# Patient Record
Sex: FEMALE | Race: WHITE | Employment: UNEMPLOYED | URBAN - METROPOLITAN AREA
[De-identification: names, ages, dates, MRNs, and addresses within clinical notes are randomized per-mention and may not be internally consistent; named-entity substitution may affect disease eponyms.]

---

## 2021-01-01 ENCOUNTER — OFFICE VISIT (OUTPATIENT)
Dept: NEUROLOGY | Age: 41
End: 2021-01-01
Payer: COMMERCIAL

## 2021-01-01 VITALS
TEMPERATURE: 97.7 F | RESPIRATION RATE: 18 BRPM | WEIGHT: 149 LBS | HEART RATE: 108 BPM | SYSTOLIC BLOOD PRESSURE: 141 MMHG | BODY MASS INDEX: 23.39 KG/M2 | DIASTOLIC BLOOD PRESSURE: 81 MMHG | HEIGHT: 67 IN

## 2021-01-01 DIAGNOSIS — G40.909 SEIZURE DISORDER (HCC): Primary | ICD-10-CM

## 2021-01-01 PROCEDURE — 99203 OFFICE O/P NEW LOW 30 MIN: CPT | Performed by: NEUROMUSCULOSKELETAL MEDICINE, SPORTS MEDICINE

## 2021-01-01 RX ORDER — AMLODIPINE BESYLATE 10 MG/1
10 TABLET ORAL DAILY
COMMUNITY

## 2021-01-01 RX ORDER — LEVETIRACETAM 500 MG/1
1000 TABLET ORAL 2 TIMES DAILY
COMMUNITY
End: 2021-01-01 | Stop reason: SDUPTHER

## 2021-01-01 RX ORDER — ATORVASTATIN CALCIUM 40 MG/1
40 TABLET, FILM COATED ORAL DAILY
COMMUNITY

## 2021-01-01 RX ORDER — FENOFIBRATE 160 MG/1
160 TABLET ORAL DAILY
COMMUNITY

## 2021-01-01 RX ORDER — LEVETIRACETAM 500 MG/1
1000 TABLET ORAL 2 TIMES DAILY
Qty: 120 TABLET | Refills: 5 | Status: SHIPPED | OUTPATIENT
Start: 2021-01-01 | End: 2022-01-01 | Stop reason: SDUPTHER

## 2021-01-01 RX ORDER — FERROUS SULFATE 325(65) MG
325 TABLET ORAL
COMMUNITY

## 2021-01-01 RX ORDER — ASPIRIN 81 MG/1
81 TABLET ORAL DAILY
COMMUNITY

## 2021-10-18 NOTE — PROGRESS NOTES
NEUROLOGY CONSULT    Patient Name:  Ashley Arriaga  :   1980  Clinic Visit Date: 10/18/2021    I saw Ms. Ashley Arriaga  in the neurology clinic today for continuity of care regarding a seizure disorder. Patient is a 66-year-old right-handed lady with a history of multiple problems including hypertension hyperlipidemia peripheral vascular disease, diabetes with diagnosis of \"peripheral neuropathy \", seen recently by another neurologist, seen in the office today for follow-up evaluation of her seizure disorder as her insurance has changed. She says that she started having seizures about a year ago. She describes seizures as \"passing out \"without any warning symptoms. She has been found in the floor at home by her  on many occasions. Denies having aura prior to passing out. Does not recollect having incontinence or tongue bite. No witnessed abnormal extremity movements. She says that she used to have these episodes fairly frequently until she was started on Keppra by her neurologist in 2020. She is currently on 1000 mg twice daily and since then she has done well without any seizures. Work-up according to her history includes an MRI and EEG both of which have been unremarkable. Scans or reports not available for review at this time. She denies headache, abnormal visual symptoms, facial numbness or weakness, slurred speech, neck pain lower back pain or weakness or numbness in the extremities. No family history of seizures. No history of head trauma in the past    REVIEW OF SYSTEMS    Constitutional Weight changes: absent, change in appetite: absent Fatigue: absent; Fevers : absent, Any recent hospitalizations:  absent   HEENT Ears: normal,  Visual disturbance: absent   Respiratory Shortness of breath: absent, choking:  absent, Cough: absent, Snoring : absent   Cardiovascular Chest pain: absent, Leg swelling :absent, palpitations : absent, fainting : absent   GI Constipation: absent, Diarrhea: absent, Swallowing change: absent    Urinary frequency: absent, Urinary urgency: absent, Urinary incontinence: absent   Musculoskeletal Neck pain: absent, Back pain: absent, Stiffness: present, Muscle pain: absent, Joint pain: absent, restless leg : present   Dermatological Hair loss: absent, Skin changes: absent   Neurological Confusion: absent, Trouble concentrating: present, Seizures: present;  Memory loss: absent, balance problem: present, Dizziness: present, vertigo: absent, Weakness: absent, Numbness absent, Tremor: absent, Spasm: absent, involuntary movement: absent, Speech difficulty: absent, Headache: absent, Light sensitivity: absent   Psychiatric Anxiety: present, Depression  present, drug abuse: absent, Hallucination: absent, mood disorder: absent, Suicidal ideations absent   Hematologic Abnormal bleeding: absent, Anemia: absent, Lymph gland changes: absent Clotting disorder: absent     Past Medical History:   Diagnosis Date    Diabetes mellitus ([facility] [place] [facility]     Hypertension     Seizures ([facility] [place] [facility]        Past Surgical History:   Procedure Laterality Date    CATARACT REMOVAL Bilateral     CHOLECYSTECTOMY      TONSILLECTOMY         Social History     Socioeconomic History    Marital status:      Spouse name: Not on file    Number of children: Not on file    Years of education: Not on file    Highest education level: Not on file   Occupational History    Not on file   Tobacco Use    Smoking status: Never Smoker    Smokeless tobacco: Never Used   Vaping Use    Vaping Use: Never used   Substance and Sexual Activity    Alcohol use: Never    Drug use: Never    Sexual activity: Not on file   Other Topics Concern    Not on file   Social History Narrative    Not on file     Social Determinants of Health     Financial Resource Strain:     Difficulty of Paying Living Expenses:    Food Insecurity:     Worried About Running Out of Food in the Last Year:     Ran Out CREATININE, BUN, CO2, TSH, INR, GLUF, LABA1C, LABMICR    BP (!) 141/81 (Site: Right Upper Arm, Position: Sitting, Cuff Size: Medium Adult)   Pulse 108   Temp 97.7 °F (36.5 °C) (Temporal)   Resp 18   Ht 5' 7\" (1.702 m)   Wt 149 lb (67.6 kg)   BMI 23.34 kg/m²     NEUROLOGICAL EXAMINATION:     MENTAL STATUS: Patient is alert and oriented. No confusion or aphasia. Memory is normal.     CRANIAL NERVES: Pupils are equal and reactive. EOMS are equal in all directions. No nystagmus or any other abnormal eye movements. Facial sensation is normal.  There is no facial weakness. There is no loss of hearing. Palate and tongue movements are normal.     MOTOR EXAMINATION: Muscle tone is normal in all the limbs. There is no focal muscle wasting or weakness. Muscle strength is 5/5 in both upper and lower limbs. abnormal limb movements. SENSORY EXAMINATION: No sensory level. Normal to light touch, vibration and position sensation both lower and upper extremities. STRETCH REFLEXES: 2+ and symmetrical in both the upper and lower limbs, except for absent ankle jerks bilaterally. Cloteal Jonas GAIT: Normal    Romberg sign is negative. IMPRESSION:  Seizure disorder. Etiology is not clear. Probably idiopathic. Diagnosed about a year ago by another neurologist.  Presently doing well on Keppra 1000 mg twice daily    PLAN:    1. Continue Keppra 1000 mg twice daily. 2.  Need to review MRI scan and EEG results. Will request records. 3.  Follow-up in 3 months for reevaluation for history and to review test results    NOTE: This neurology evaluation is part of outpatient coverage at [facility]  1-2 days per week. Patients requiring frequent evaluations or uncomfortable with potential 3-4 day turnaround on questions or calls  may be better served by a neurologist in the area full time. [facility] [address]. Dre is available for outpatient visits and procedures including EMG/NCS.   [facility] system neurologists also practice in [facility] (Dr. Pedro Schultz) and [place] (Meg Rodriguez).        Cullen Vanessa MD   10/18/2021  10:44 AM

## 2022-01-01 ENCOUNTER — TELEPHONE (OUTPATIENT)
Dept: NEUROLOGY | Age: 42
End: 2022-01-01

## 2022-01-01 ENCOUNTER — OFFICE VISIT (OUTPATIENT)
Dept: NEUROLOGY | Age: 42
End: 2022-01-01
Payer: COMMERCIAL

## 2022-01-01 VITALS
SYSTOLIC BLOOD PRESSURE: 134 MMHG | WEIGHT: 150.8 LBS | RESPIRATION RATE: 18 BRPM | BODY MASS INDEX: 24.23 KG/M2 | HEART RATE: 107 BPM | DIASTOLIC BLOOD PRESSURE: 86 MMHG | TEMPERATURE: 97.7 F | HEIGHT: 66 IN

## 2022-01-01 DIAGNOSIS — G43.009 MIGRAINE WITHOUT AURA AND WITHOUT STATUS MIGRAINOSUS, NOT INTRACTABLE: ICD-10-CM

## 2022-01-01 DIAGNOSIS — G40.909 SEIZURE DISORDER (HCC): Primary | ICD-10-CM

## 2022-01-01 PROCEDURE — 99214 OFFICE O/P EST MOD 30 MIN: CPT | Performed by: NEUROMUSCULOSKELETAL MEDICINE, SPORTS MEDICINE

## 2022-01-01 RX ORDER — ELETRIPTAN HYDROBROMIDE 40 MG/1
40 TABLET, FILM COATED ORAL
Qty: 6 TABLET | Refills: 2 | Status: SHIPPED | OUTPATIENT
Start: 2022-01-01 | End: 2022-01-01

## 2022-01-01 RX ORDER — LEVETIRACETAM 500 MG/1
1000 TABLET ORAL 2 TIMES DAILY
Qty: 120 TABLET | Refills: 5 | Status: SHIPPED | OUTPATIENT
Start: 2022-01-01 | End: 2022-01-01

## 2022-03-17 NOTE — PROGRESS NOTES
NEUROLOGY Follow up    Patient Name:  Raiza Connell  :   1980  Clinic Visit Date: 3/17/2022    I saw Ms. Raiza Connell  in the neurology clinic today for follow-up. 51-year-old right-handed lady with a history of multiple problems including hypertension hyperlipidemia peripheral vascular disease, diabetes with diagnosis of \"peripheral neuropathy \", seen recently by another neurologist, seen in the office today for follow-up evaluation of her seizure disorder and migraine headaches. .  No seizures reported on  Keppra 1000 mg twice a day. Headaches usually improve promptly with Relpax. No new neurological symptoms. REVIEW OF SYSTEMS    Constitutional Weight changes: absent, change in appetite: absent Fatigue: absent; Fevers : absent, Any recent hospitalizations:  absent   HEENT Ears: normal,  Visual disturbance: absent   Respiratory Shortness of breath: absent, choking:  absent, Cough: present, Snoring : absent   Cardiovascular Chest pain: absent, Leg swelling :absent, palpitations : absent, fainting : absent   GI Constipation: absent, Diarrhea: absent, Swallowing change: absent    Urinary frequency: absent, Urinary urgency: absent, Urinary incontinence: absent   Musculoskeletal Neck pain: absent, Back pain: absent, Stiffness: present, Muscle pain: present, Joint pain: absent, restless leg : absent   Dermatological Hair loss: present, Skin changes: absent   Neurological Confusion: absent, Trouble concentrating: absent, Seizures: absent;  Memory loss: absent, balance problem: absent, Dizziness: absent, vertigo: absent, Weakness: absent, Numbness absent, Tremor: absent, Spasm: absent, involuntary movement: absent, Speech difficulty: absent, Headache: absent, Light sensitivity: absent   Psychiatric Anxiety: present, Depression  present, drug abuse: absent, Hallucination: absent, mood disorder: absent, Suicidal ideations absent   Hematologic Abnormal bleeding: absent, Anemia: absent, Lymph gland changes: absent Clotting disorder: absent     Past Medical History:   Diagnosis Date    [facility] ([facility]     Hypertension     [facility]        Past Surgical History:   Procedure Laterality Date    CATARACT REMOVAL Bilateral     CHOLECYSTECTOMY      TONSILLECTOMY         Social History     Socioeconomic History    Marital status:      Spouse name: Not on file    Number of children: Not on file    Years of education: Not on file    Highest education level: Not on file   Occupational History    Not on file   Tobacco Use    Smoking status: Never Smoker    Smokeless tobacco: Never Used   Vaping Use    Vaping Use: Never used   Substance and Sexual Activity    Alcohol use: Never    Drug use: Never    Sexual activity: Not on file   Other Topics Concern    Not on file   Social History Narrative    Not on file     Social Determinants of Health     Financial Resource Strain:     Difficulty of Paying Living Expenses: Not on file   Food Insecurity:     Worried About [address] in the Last Year: Not on file    [address] in the Last Year: Not on file   Transportation Needs:     Lack of Transportation (Medical): Not on file    Lack of Transportation (Non-Medical):  Not on file   Physical Activity:     Days of Exercise per Week: Not on file    Minutes of Exercise per Session: Not on file   Stress:     Feeling of Stress : Not on file   Social Connections:     Frequency of Communication with Friends and Family: Not on file    Frequency of Social Gatherings with Friends and Family: Not on file    Attends Anabaptist Services: Not on file    Active Member of Clubs or Organizations: Not on file    Attends Club or Organization Meetings: Not on file    Marital Status: Not on file   Intimate Partner Violence:     Fear of Current or Ex-Partner: Not on file    Emotionally Abused: Not on file    Physically Abused: Not on file    Sexually Abused: Not on file   Housing Stability:     Unable to Pay for Housing in the Last Year: Not on file    Number of Places Lived in the Last Year: Not on file    Unstable Housing in the Last Year: Not on file       History reviewed. No pertinent family history. Current Outpatient Medications   Medication Sig Dispense Refill    levETIRAcetam (KEPPRA) 500 MG tablet Take 2 tablets by mouth 2 times daily 120 tablet 5    eletriptan (RELPAX) 40 MG tablet Take 1 tablet by mouth once as needed (For severe migraine headaches) may repeat in 2 hours if necessary 6 tablet 2    aspirin 81 MG EC tablet Take 81 mg by mouth daily      ferrous sulfate (IRON 325) 325 (65 Fe) MG tablet Take 325 mg by mouth daily (with breakfast)      amLODIPine (NORVASC) 10 MG tablet Take 10 mg by mouth daily      atorvastatin (LIPITOR) 40 MG tablet Take 40 mg by mouth daily      fenofibrate (TRIGLIDE) 160 MG tablet Take 160 mg by mouth daily      promethazine (PHENERGAN) 25 MG tablet Take 25 mg by mouth every 6 hours as needed for Nausea      Insulin Lispro (HUMALOG KWIKPEN SC) Inject into the skin 25 units TID      citalopram (CELEXA) 20 MG tablet Take 20 mg by mouth daily      insulin glargine (BASAGLAR KWIKPEN) 100 UNIT/ML injection pen Inject into the skin nightly      traZODone (DESYREL) 150 MG tablet Take 150 mg by mouth nightly      clopidogrel (PLAVIX) 75 MG tablet Take 75 mg by mouth daily      fexofenadine (ALLEGRA ALLERGY) 180 MG tablet Take 180 mg by mouth daily       No current facility-administered medications for this visit.         DATA:  No results found for: WBC, HGB, PLT, CHOL, TRIG, HDL, LDLDIRECT, ALT, AST, NA, K, CL, CREATININE, BUN, CO2, TSH, INR, GLUF, LABA1C, LABMICR    /86 (Site: Left Upper Arm, Position: Sitting, Cuff Size: Medium Adult)   Pulse 107   Temp 97.7 °F (36.5 °C) (Temporal)   Resp 18   Ht 5' 6\" (1.676 m)   Wt 150 lb 12.8 oz (68.4 kg)   BMI 24.34 kg/m²     NEUROLOGICAL EXAMINATION:     MENTAL STATUS: Normal    CRANIAL NERVES: II-XII are normal    MOTOR EXAMINATION: Muscle tone is normal in all the limbs. No focal weakness. No abnormal limb movements    SENSORY EXAMINATION: Normal.     STRETCH REFLEXES: 2+ and symmetrical in both the upper and lower limbs, except for absent ankle jerks bilaterally. Joselin Neighbours GAIT: Normal      IMPRESSION:    1. Seizure disorder. Doing well on Keppra  2. Migraine headaches. Stable. PLAN:    1. Continue Keppra 1000 mg twice daily. 2.  Relpax for migraine headaches as needed. 3.   Follow up  6 months    NOTE: This neurology evaluation is part of outpatient coverage at [facility]  1-2 days per week. Patients requiring frequent evaluations or uncomfortable with potential 3-4 day turnaround on questions or calls  may be better served by a neurologist in the area full time. [facility] at [facility]. Dre is available for outpatient visits and procedures including EMG/NCS. Non-[facility] neurologists also practice in [facility] (Dr. Joao Landa) and [facility] (Meg Cheng).        Deejay Fierro MD   3/17/2022  5:25 PM

## 2022-03-17 NOTE — PATIENT INSTRUCTIONS
SURVEY:    You may be receiving a survey from [employer] regarding your visit today. Please complete the survey to enable us to provide the highest quality of care to you and your family. If you cannot score us a very good on any question, please call the office to discuss how we could have made your experience a very good one. Thank you.

## 2022-04-11 NOTE — TELEPHONE ENCOUNTER
Called patient to inform her Eletriptan has been denied . Writer spoke with doctor and he wanted me to call and ask her if she would like to try maxalt or imitrex as requested by insurance.  Writer called and left message for patient to call back and let me know